# Patient Record
Sex: MALE | Race: WHITE | URBAN - NONMETROPOLITAN AREA
[De-identification: names, ages, dates, MRNs, and addresses within clinical notes are randomized per-mention and may not be internally consistent; named-entity substitution may affect disease eponyms.]

---

## 2021-06-02 ENCOUNTER — WEB ENCOUNTER (OUTPATIENT)
Dept: URBAN - NONMETROPOLITAN AREA CLINIC 4 | Facility: CLINIC | Age: 41
End: 2021-06-02

## 2021-06-02 ENCOUNTER — DASHBOARD ENCOUNTERS (OUTPATIENT)
Age: 41
End: 2021-06-02

## 2021-06-02 ENCOUNTER — OFFICE VISIT (OUTPATIENT)
Dept: URBAN - NONMETROPOLITAN AREA CLINIC 4 | Facility: CLINIC | Age: 41
End: 2021-06-02
Payer: COMMERCIAL

## 2021-06-02 VITALS
HEIGHT: 69 IN | TEMPERATURE: 97.7 F | WEIGHT: 237.6 LBS | HEART RATE: 62 BPM | DIASTOLIC BLOOD PRESSURE: 69 MMHG | BODY MASS INDEX: 35.19 KG/M2 | SYSTOLIC BLOOD PRESSURE: 131 MMHG

## 2021-06-02 DIAGNOSIS — K42.9 UMBILICAL HERNIA WITHOUT OBSTRUCTION AND WITHOUT GANGRENE: ICD-10-CM

## 2021-06-02 PROCEDURE — 99203 OFFICE O/P NEW LOW 30 MIN: CPT | Performed by: INTERNAL MEDICINE

## 2021-06-02 NOTE — PHYSICAL EXAM GASTROINTESTINAL
Abdomen , soft, obese, nontender, umnilical hernia noted, no guarding or rigidity , no masses palpable , normal bowel sounds , Liver and Spleen , no hepatomegaly present , no hepatosplenomegaly , liver nontender , spleen not palpable

## 2021-06-02 NOTE — PHYSICAL EXAM SKIN:
Nephrology no rashes , no suspicious lesions , no areas of discoloration , no jaundice present , good turgor , no masses , no tenderness on palpation

## 2021-06-02 NOTE — PHYSICAL EXAM NEUROLOGIC:
Patient currently drinking juice from bottle.   oriented to person, place and time , normal sensation , short and long term memory intact

## 2021-06-02 NOTE — HPI-TODAY'S VISIT:
6/2/12: Pt is a 42 yo male with no significant PMH who was referred by ESTEFANY Bowman, for evaluation of umbilical hernia.   Pt has had umbilical hernia for past 4-5 yrs. In past year, has become more pronounced. He has never had surgery. Denies any abdominal pain. He feels more bloated. Denies N/V, heartburn, acid reflux, gas, diarrhea, constipation, melena or hematochezia. No unintenitonal weight loss. No FHx of GI malignancy.